# Patient Record
Sex: FEMALE | NOT HISPANIC OR LATINO
[De-identification: names, ages, dates, MRNs, and addresses within clinical notes are randomized per-mention and may not be internally consistent; named-entity substitution may affect disease eponyms.]

---

## 2017-07-21 ENCOUNTER — APPOINTMENT (OUTPATIENT)
Dept: VASCULAR SURGERY | Facility: CLINIC | Age: 32
End: 2017-07-21

## 2017-07-21 VITALS
BODY MASS INDEX: 38.3 KG/M2 | SYSTOLIC BLOOD PRESSURE: 129 MMHG | HEART RATE: 90 BPM | HEIGHT: 59 IN | DIASTOLIC BLOOD PRESSURE: 87 MMHG | TEMPERATURE: 98.2 F | WEIGHT: 190 LBS

## 2017-07-21 RX ORDER — AMOXICILLIN AND CLAVULANATE POTASSIUM 875; 125 MG/1; MG/1
875-125 TABLET, COATED ORAL
Qty: 20 | Refills: 0 | Status: ACTIVE | COMMUNITY
Start: 2017-03-03

## 2017-07-21 RX ORDER — NITROFURANTOIN (MONOHYDRATE/MACROCRYSTALS) 25; 75 MG/1; MG/1
100 CAPSULE ORAL
Qty: 20 | Refills: 0 | Status: ACTIVE | COMMUNITY
Start: 2017-02-05

## 2017-07-21 RX ORDER — FLUTICASONE PROPIONATE 50 UG/1
50 SPRAY, METERED NASAL
Qty: 16 | Refills: 0 | Status: ACTIVE | COMMUNITY
Start: 2017-03-03

## 2017-07-21 RX ORDER — ATORVASTATIN CALCIUM 20 MG/1
20 TABLET, FILM COATED ORAL
Qty: 90 | Refills: 0 | Status: ACTIVE | COMMUNITY
Start: 2017-01-24

## 2017-07-21 RX ORDER — SULFAMETHOXAZOLE AND TRIMETHOPRIM 800; 160 MG/1; MG/1
800-160 TABLET ORAL
Qty: 20 | Refills: 0 | Status: ACTIVE | COMMUNITY
Start: 2017-03-24

## 2017-07-21 RX ORDER — MUPIROCIN 20 MG/G
2 OINTMENT TOPICAL
Qty: 22 | Refills: 0 | Status: ACTIVE | COMMUNITY
Start: 2017-06-16

## 2017-07-21 RX ORDER — SITAGLIPTIN AND METFORMIN HYDROCHLORIDE 50; 1000 MG/1; MG/1
50-1000 TABLET, FILM COATED ORAL
Qty: 180 | Refills: 0 | Status: ACTIVE | COMMUNITY
Start: 2017-04-05

## 2017-07-21 RX ORDER — METHYLPREDNISOLONE 4 MG/1
4 TABLET ORAL
Qty: 21 | Refills: 0 | Status: ACTIVE | COMMUNITY
Start: 2017-03-24

## 2017-07-21 RX ORDER — CIPROFLOXACIN AND DEXAMETHASONE 3; 1 MG/ML; MG/ML
0.3-0.1 SUSPENSION/ DROPS AURICULAR (OTIC)
Qty: 8 | Refills: 0 | Status: ACTIVE | COMMUNITY
Start: 2017-03-24

## 2017-07-21 RX ORDER — DESMOPRESSIN ACETATE 0.2 MG/1
0.2 TABLET ORAL
Qty: 150 | Refills: 0 | Status: ACTIVE | COMMUNITY
Start: 2017-07-16

## 2018-01-26 ENCOUNTER — APPOINTMENT (OUTPATIENT)
Dept: VASCULAR SURGERY | Facility: CLINIC | Age: 33
End: 2018-01-26
Payer: COMMERCIAL

## 2018-01-26 VITALS
WEIGHT: 190 LBS | DIASTOLIC BLOOD PRESSURE: 74 MMHG | SYSTOLIC BLOOD PRESSURE: 105 MMHG | HEIGHT: 59 IN | BODY MASS INDEX: 38.3 KG/M2 | HEART RATE: 101 BPM | TEMPERATURE: 98.4 F

## 2018-01-26 PROCEDURE — 99213 OFFICE O/P EST LOW 20 MIN: CPT

## 2018-01-26 RX ORDER — CLOBETASOL PROPIONATE 0.5 MG/G
0.05 CREAM TOPICAL
Qty: 60 | Refills: 0 | Status: ACTIVE | COMMUNITY
Start: 2017-09-18

## 2018-01-26 RX ORDER — NITROFURANTOIN MACROCRYSTALS 100 MG/1
100 CAPSULE ORAL
Qty: 20 | Refills: 0 | Status: ACTIVE | COMMUNITY
Start: 2017-08-13

## 2018-01-26 RX ORDER — LEVOTHYROXINE SODIUM 25 UG/1
25 TABLET ORAL
Qty: 90 | Refills: 0 | Status: ACTIVE | COMMUNITY
Start: 2017-09-27

## 2018-01-26 RX ORDER — GLIMEPIRIDE 1 MG/1
1 TABLET ORAL
Qty: 180 | Refills: 0 | Status: ACTIVE | COMMUNITY
Start: 2017-09-27

## 2018-01-26 RX ORDER — CLOBETASOL PROPIONATE 0.5 MG/G
0.05 OINTMENT TOPICAL
Qty: 60 | Refills: 0 | Status: ACTIVE | COMMUNITY
Start: 2017-08-22

## 2018-01-26 RX ORDER — CIPROFLOXACIN HYDROCHLORIDE 500 MG/1
500 TABLET, FILM COATED ORAL
Qty: 20 | Refills: 0 | Status: ACTIVE | COMMUNITY
Start: 2017-09-27

## 2018-01-26 RX ORDER — FENOFIBRATE 160 MG/1
160 TABLET ORAL
Qty: 90 | Refills: 0 | Status: ACTIVE | COMMUNITY
Start: 2017-09-27

## 2018-01-26 RX ORDER — DOXYCYCLINE HYCLATE 100 MG/1
100 CAPSULE ORAL
Qty: 20 | Refills: 0 | Status: ACTIVE | COMMUNITY
Start: 2017-11-24

## 2018-01-26 RX ORDER — ATORVASTATIN CALCIUM 40 MG/1
40 TABLET, FILM COATED ORAL
Qty: 90 | Refills: 0 | Status: ACTIVE | COMMUNITY
Start: 2017-09-27

## 2018-08-24 ENCOUNTER — APPOINTMENT (OUTPATIENT)
Dept: VASCULAR SURGERY | Facility: CLINIC | Age: 33
End: 2018-08-24
Payer: COMMERCIAL

## 2018-08-24 VITALS
BODY MASS INDEX: 40.32 KG/M2 | WEIGHT: 200 LBS | HEIGHT: 59 IN | DIASTOLIC BLOOD PRESSURE: 84 MMHG | HEART RATE: 89 BPM | TEMPERATURE: 99.4 F | SYSTOLIC BLOOD PRESSURE: 127 MMHG

## 2018-08-24 DIAGNOSIS — Z00.00 ENCOUNTER FOR GENERAL ADULT MEDICAL EXAMINATION W/OUT ABNORMAL FINDINGS: ICD-10-CM

## 2018-08-24 PROCEDURE — 99214 OFFICE O/P EST MOD 30 MIN: CPT

## 2019-06-28 ENCOUNTER — APPOINTMENT (OUTPATIENT)
Dept: VASCULAR SURGERY | Facility: CLINIC | Age: 34
End: 2019-06-28
Payer: COMMERCIAL

## 2019-06-28 VITALS
DIASTOLIC BLOOD PRESSURE: 94 MMHG | HEIGHT: 59 IN | SYSTOLIC BLOOD PRESSURE: 148 MMHG | HEART RATE: 93 BPM | TEMPERATURE: 98 F

## 2019-06-28 PROCEDURE — 99213 OFFICE O/P EST LOW 20 MIN: CPT

## 2019-06-28 NOTE — DATA REVIEWED
[FreeTextEntry1] : 03/08/2013 Venous Doppler  magdalena neg dvt svt RT LSV insuff \par \par  03/08/2013 Abd Venous Duplex ivc and civ not viz magdalena eiv patent

## 2019-06-28 NOTE — ASSESSMENT
[Arterial/Venous Disease] : arterial/venous disease [Other: _____] : [unfilled] [Foot care/Footwear] : foot care/footwear [FreeTextEntry1] : Impression stable venous insuff\par \par Med Conserv management leg elevation,continue  custom knee high comp stockings, wt loss, diet control\par ov 6mo prn \par \par letter faxed to Dr CHRISTINE Pace DPM \par \par Varicose veins are enlarged, twisted veins. Varicose veins are caused by increased blood pressure in the veins.  The blood moves towards the heart by 1-way valves in the veins. When the valves become weakened or damaged, blood can collect in the veins and pool in your lower legs (ankles). This causes the veins to become enlarged and incompetent with reflux. Sitting or standing for long periods can cause blood to pool in the leg veins, increasing the pressure within the veins. \par Risk factors for varicose veins or venous disease may include:  obesity, older age, standing or sitting for prolonged periods of time for several years, being female, pregnancy, taking oral contraceptive pills or hormone replacement, being inactive, and/or smoking. \par The most common symptoms of varicose veins are sensations in the legs, such as a heavy feeling, burning, and/or aching. However, each individual may experience symptoms differently.  Other symptoms may include:  color changes in the skin, sores on the legs, or rash.  Severe varicose veins or venous disease may eventually produce long-term mild swelling that can result in more serious skin and tissue problems, such as ulcers and non-healing sores.\par Varicose veins and venous disease are diagnosed by a complete medical history, physical examination, and diagnostic studies for varicose veins including duplex ultrasound and color-flow imaging.  \par Medical treatment for varicose veins and venous disease include:  compression stockings, sclerotherapy, endovenous ablation and/or surgical treatment with microphlebectomy.  \par \par

## 2019-06-28 NOTE — PHYSICAL EXAM
[Normal Breath Sounds] : Normal breath sounds [1+] : left 1+ [Ankle Swelling Bilaterally] : bilaterally  [Ankle Swelling (On Exam)] : present [Varicose Veins Of Lower Extremities] : bilaterally [Ankle Swelling On The Right] : mild [] : bilaterally [No HSM] : no hepatosplenomegaly [No Rash or Lesion] : No rash or lesion [Alert] : alert [Oriented to Person] : oriented to person [Oriented to Place] : oriented to place [Oriented to Time] : oriented to time [Calm] : calm [JVD] : no jugular venous distention  [Abdomen Masses] : No abdominal masses [Tender] : was nontender [Stool Sample Taken] : No stool obtained  on rectal exam [de-identified] : nad [de-identified] : wnl [FreeTextEntry1] : Moderate bilateral leg venous insufficiency \par w mild bilateral leg stasis dermatitis \par and mild bilateral leg  edema \par no wounds/ulcers\par Mild arterial insuff w mild trophic skin changes\par no wounds/ulcers\par  [de-identified] : wnl [de-identified] : in wheelchair  [de-identified] : Federico Cranial nerves 2-12 federico grossly intact [de-identified] : cooperative

## 2019-06-28 NOTE — HISTORY OF PRESENT ILLNESS
[FreeTextEntry1] : pt is compliant w compression stockings \par pt c/o mild  swelling since last ov,   no pain since last ov c/o \par pt does not have any le sensation  which is unchanged since last ov

## 2020-01-08 ENCOUNTER — APPOINTMENT (OUTPATIENT)
Dept: VASCULAR SURGERY | Facility: CLINIC | Age: 35
End: 2020-01-08
Payer: COMMERCIAL

## 2020-01-08 VITALS
TEMPERATURE: 98.6 F | DIASTOLIC BLOOD PRESSURE: 78 MMHG | HEIGHT: 59 IN | SYSTOLIC BLOOD PRESSURE: 118 MMHG | WEIGHT: 275 LBS | HEART RATE: 81 BPM | BODY MASS INDEX: 55.44 KG/M2

## 2020-01-08 DIAGNOSIS — I83.899 VARICOSE VEINS OF UNSPECIFIED LOWER EXTREMITY WITH OTHER COMPLICATIONS: ICD-10-CM

## 2020-01-08 PROCEDURE — 99213 OFFICE O/P EST LOW 20 MIN: CPT

## 2020-01-08 NOTE — ASSESSMENT
[Other: _____] : [unfilled] [Arterial/Venous Disease] : arterial/venous disease [Foot care/Footwear] : foot care/footwear [FreeTextEntry1] : Impression stable venous insuff\par \par Med Conserv management leg elevation,continue  custom knee high comp stockings, wt loss, diet control\par ov 12mo prn \par \par letter faxed to Dr CHRISTINE Pace DPM \par \par Varicose veins are enlarged, twisted veins. Varicose veins are caused by increased blood pressure in the veins.  The blood moves towards the heart by 1-way valves in the veins. When the valves become weakened or damaged, blood can collect in the veins and pool in your lower legs (ankles). This causes the veins to become enlarged and incompetent with reflux. Sitting or standing for long periods can cause blood to pool in the leg veins, increasing the pressure within the veins. \par Risk factors for varicose veins or venous disease may include:  obesity, older age, standing or sitting for prolonged periods of time for several years, being female, pregnancy, taking oral contraceptive pills or hormone replacement, being inactive, and/or smoking. \par The most common symptoms of varicose veins are sensations in the legs, such as a heavy feeling, burning, and/or aching. However, each individual may experience symptoms differently.  Other symptoms may include:  color changes in the skin, sores on the legs, or rash.  Severe varicose veins or venous disease may eventually produce long-term mild swelling that can result in more serious skin and tissue problems, such as ulcers and non-healing sores.\par Varicose veins and venous disease are diagnosed by a complete medical history, physical examination, and diagnostic studies for varicose veins including duplex ultrasound and color-flow imaging.  \par Medical treatment for varicose veins and venous disease include:  compression stockings, sclerotherapy, endovenous ablation and/or surgical treatment with microphlebectomy.  \par \par

## 2020-01-08 NOTE — PHYSICAL EXAM
[Normal Breath Sounds] : Normal breath sounds [Ankle Swelling (On Exam)] : present [1+] : left 1+ [Ankle Swelling Bilaterally] : bilaterally  [Varicose Veins Of Lower Extremities] : bilaterally [Ankle Swelling On The Right] : mild [] : bilaterally [No HSM] : no hepatosplenomegaly [No Rash or Lesion] : No rash or lesion [Alert] : alert [Oriented to Person] : oriented to person [Oriented to Place] : oriented to place [Oriented to Time] : oriented to time [Calm] : calm [JVD] : no jugular venous distention  [Abdomen Masses] : No abdominal masses [Tender] : was nontender [Stool Sample Taken] : No stool obtained  on rectal exam [de-identified] : nad [de-identified] : wnl [FreeTextEntry1] : Moderate bilateral leg venous insufficiency \par w mild bilateral leg stasis dermatitis \par and mild bilateral leg  edema \par no wounds/ulcers\par Mild arterial insuff w mild trophic skin changes\par no wounds/ulcers\par  [de-identified] : wnl [de-identified] : in wheelchair  [de-identified] : Federico Cranial nerves 2-12 federico grossly intact [de-identified] : cooperative

## 2020-01-08 NOTE — HISTORY OF PRESENT ILLNESS
[FreeTextEntry1] : pt is compliant w compression stockings \par pt c/o mild  swelling since last ov,   no pain since last ov c/o \par pt does not have any le sensation  which is unchanged since last ov \par no acute changes

## 2020-11-11 ENCOUNTER — APPOINTMENT (OUTPATIENT)
Dept: VASCULAR SURGERY | Facility: CLINIC | Age: 35
End: 2020-11-11

## 2021-01-26 ENCOUNTER — APPOINTMENT (OUTPATIENT)
Dept: VASCULAR SURGERY | Facility: CLINIC | Age: 36
End: 2021-01-26
Payer: COMMERCIAL

## 2021-01-26 VITALS
WEIGHT: 190 LBS | SYSTOLIC BLOOD PRESSURE: 98 MMHG | DIASTOLIC BLOOD PRESSURE: 68 MMHG | HEART RATE: 87 BPM | TEMPERATURE: 98.1 F | BODY MASS INDEX: 38.38 KG/M2

## 2021-01-26 DIAGNOSIS — M79.89 OTHER SPECIFIED SOFT TISSUE DISORDERS: ICD-10-CM

## 2021-01-26 PROCEDURE — 93970 EXTREMITY STUDY: CPT

## 2021-01-26 PROCEDURE — 99214 OFFICE O/P EST MOD 30 MIN: CPT

## 2021-01-26 PROCEDURE — 99072 ADDL SUPL MATRL&STAF TM PHE: CPT

## 2021-01-26 NOTE — HISTORY OF PRESENT ILLNESS
[FreeTextEntry1] : pt is compliant w compression stockings \par pt c/o mild  swelling since last ov,   no pain since last ov c/o \par pt does not have any le sensation  which is unchanged since last ov \par no acute changes [de-identified] : pt is compliant w comp stockings\par pt c/o new magdalena le swelling

## 2021-01-26 NOTE — DATA REVIEWED
[FreeTextEntry1] : 03/08/2013 Venous Doppler  federico neg dvt svt RT LSV insuff \par \par  03/08/2013 Abd Venous Duplex ivc and civ not viz federico eiv patent\par \par 1/26/2021 Venous Duplex Federico LE no acute dvt svt

## 2021-01-26 NOTE — ASSESSMENT
[Arterial/Venous Disease] : arterial/venous disease [Other: _____] : [unfilled] [Foot care/Footwear] : foot care/footwear [FreeTextEntry1] : Impression stable venous insuff\par \par Med Conserv management leg elevation,continue  custom knee high comp stockings 20-30mm Hg w zipper  (rx given) , wt loss, diet control\par telehealth visit aug 2021 \par rto prn \par \par letter faxed to Dr CHRISTINE Pace DPM \par \par Varicose veins are enlarged, twisted veins. Varicose veins are caused by increased blood pressure in the veins.  The blood moves towards the heart by 1-way valves in the veins. When the valves become weakened or damaged, blood can collect in the veins and pool in your lower legs (ankles). This causes the veins to become enlarged and incompetent with reflux. Sitting or standing for long periods can cause blood to pool in the leg veins, increasing the pressure within the veins. \par Risk factors for varicose veins or venous disease may include:  obesity, older age, standing or sitting for prolonged periods of time for several years, being female, pregnancy, taking oral contraceptive pills or hormone replacement, being inactive, and/or smoking. \par The most common symptoms of varicose veins are sensations in the legs, such as a heavy feeling, burning, and/or aching. However, each individual may experience symptoms differently.  Other symptoms may include:  color changes in the skin, sores on the legs, or rash.  Severe varicose veins or venous disease may eventually produce long-term mild swelling that can result in more serious skin and tissue problems, such as ulcers and non-healing sores.\par Varicose veins and venous disease are diagnosed by a complete medical history, physical examination, and diagnostic studies for varicose veins including duplex ultrasound and color-flow imaging.  \par Medical treatment for varicose veins and venous disease include:  compression stockings, sclerotherapy, endovenous ablation and/or surgical treatment with microphlebectomy.  \par \par

## 2021-08-18 ENCOUNTER — APPOINTMENT (OUTPATIENT)
Dept: ENDOVASCULAR SURGERY | Facility: CLINIC | Age: 36
End: 2021-08-18
Payer: COMMERCIAL

## 2021-08-18 ENCOUNTER — APPOINTMENT (OUTPATIENT)
Dept: VASCULAR SURGERY | Facility: CLINIC | Age: 36
End: 2021-08-18

## 2021-08-18 ENCOUNTER — APPOINTMENT (OUTPATIENT)
Dept: ENDOVASCULAR SURGERY | Facility: CLINIC | Age: 36
End: 2021-08-18

## 2021-08-18 DIAGNOSIS — I87.2 VENOUS INSUFFICIENCY (CHRONIC) (PERIPHERAL): ICD-10-CM

## 2021-08-18 DIAGNOSIS — I83.893 VARICOSE VEINS OF BILATERAL LOWER EXTREMITIES WITH OTHER COMPLICATIONS: ICD-10-CM

## 2021-08-18 PROCEDURE — 99441: CPT | Mod: 95

## 2021-08-18 PROCEDURE — XXXXX: CPT | Mod: 1L

## 2021-08-18 NOTE — HISTORY OF PRESENT ILLNESS
[FreeTextEntry1] : pt is compliant w compression stockings \par pt c/o mild  swelling since last ov,   no pain since last ov c/o \par pt does not have any le sensation  which is unchanged since last ov \par no acute changes [de-identified] : pt is compliant w comp stockings\par pt  states no new c/o

## 2021-08-18 NOTE — HISTORY OF PRESENT ILLNESS
[FreeTextEntry1] : pt is compliant w compression stockings \par pt c/o mild  swelling since last ov,   no pain since last ov c/o \par pt does not have any le sensation  which is unchanged since last ov \par no acute changes [de-identified] : pt is compliant w comp stockings\par pt  states no new c/o

## 2021-08-18 NOTE — PHYSICAL EXAM
[No Rash or Lesion] : No rash or lesion [Alert] : alert [Oriented to Person] : oriented to person [Oriented to Place] : oriented to place [Oriented to Time] : oriented to time [Calm] : calm [Stool Sample Taken] : No stool obtained  on rectal exam [de-identified] : no resp distress [FreeTextEntry1] : Physical exam findings via telephonic review with patient \par \par  [de-identified] : cooperative

## 2021-08-18 NOTE — REASON FOR VISIT
[Follow-Up: _____] : a [unfilled] follow-up visit [Home] : at home, [unfilled] , at the time of the visit. [Medical Office: ([facility])___] : at the medical office located in  [Other:____] : [unfilled] [Verbal consent obtained from patient] : the patient, [unfilled] [FreeTextEntry1] : for magdalena leg swelling

## 2021-08-18 NOTE — PHYSICAL EXAM
[No Rash or Lesion] : No rash or lesion [Alert] : alert [Oriented to Person] : oriented to person [Oriented to Place] : oriented to place [Oriented to Time] : oriented to time [Calm] : calm [Stool Sample Taken] : No stool obtained  on rectal exam [de-identified] : no resp distress [FreeTextEntry1] : Physical exam findings via telephonic review with patient \par \par  [de-identified] : cooperative

## 2021-08-18 NOTE — ASSESSMENT
[Arterial/Venous Disease] : arterial/venous disease [Other: _____] : [unfilled] [Foot care/Footwear] : foot care/footwear [FreeTextEntry1] : Impression stable venous insuff\par \par Med Conserv management leg elevation,continue  custom knee high comp stockings 20-30mm Hg w zipper, wt loss, diet control\par telehealth visit 6mo   feb/march 2022\par Telephonic visit  time duration 7 min\par rto prn \par \par letter faxed to Dr CHRISTINE Pace DPM \par \par Varicose veins are enlarged, twisted veins. Varicose veins are caused by increased blood pressure in the veins.  The blood moves towards the heart by 1-way valves in the veins. When the valves become weakened or damaged, blood can collect in the veins and pool in your lower legs (ankles). This causes the veins to become enlarged and incompetent with reflux. Sitting or standing for long periods can cause blood to pool in the leg veins, increasing the pressure within the veins. \par Risk factors for varicose veins or venous disease may include:  obesity, older age, standing or sitting for prolonged periods of time for several years, being female, pregnancy, taking oral contraceptive pills or hormone replacement, being inactive, and/or smoking. \par The most common symptoms of varicose veins are sensations in the legs, such as a heavy feeling, burning, and/or aching. However, each individual may experience symptoms differently.  Other symptoms may include:  color changes in the skin, sores on the legs, or rash.  Severe varicose veins or venous disease may eventually produce long-term mild swelling that can result in more serious skin and tissue problems, such as ulcers and non-healing sores.\par Varicose veins and venous disease are diagnosed by a complete medical history, physical examination, and diagnostic studies for varicose veins including duplex ultrasound and color-flow imaging.  \par Medical treatment for varicose veins and venous disease include:  compression stockings, sclerotherapy, endovenous ablation and/or surgical treatment with microphlebectomy.  \par \par

## 2021-08-18 NOTE — REASON FOR VISIT
[Follow-Up: _____] : a [unfilled] follow-up visit [Home] : at home, [unfilled] , at the time of the visit. [Medical Office: ([facility])___] : at the medical office located in  [Other:____] : [unfilled] [Verbal consent obtained from patient] : the patient, [unfilled] [FreeTextEntry1] : for magdalena leg swelling

## 2021-08-18 NOTE — DATA REVIEWED
[FreeTextEntry1] : 03/08/2013 Venous Doppler  federico neg dvt svt RT LSV insuff \par \par  03/08/2013 Abd Venous Duplex ivc and civ not viz federico eiv patent\par \par 1/26/2021 Venous Duplex Federico LE no acute dvt svt\par \par

## 2024-02-23 NOTE — PHYSICAL EXAM
[Normal Breath Sounds] : Normal breath sounds [1+] : left 1+ [Ankle Swelling (On Exam)] : present [Ankle Swelling Bilaterally] : bilaterally  [Varicose Veins Of Lower Extremities] : bilaterally [] : bilaterally [Ankle Swelling On The Right] : mild [No HSM] : no hepatosplenomegaly [No Rash or Lesion] : No rash or lesion [Alert] : alert [Oriented to Person] : oriented to person [Oriented to Place] : oriented to place [Oriented to Time] : oriented to time [Calm] : calm [JVD] : no jugular venous distention  [Abdomen Masses] : No abdominal masses [Tender] : was nontender [Stool Sample Taken] : No stool obtained  on rectal exam [de-identified] : nad [de-identified] : wnl [FreeTextEntry1] : Moderate bilateral leg venous insufficiency \par w mild bilateral leg stasis dermatitis \par and mild to mod  bilateral leg  edema \par no wounds/ulcers\par Mild arterial insuff w mild trophic skin changes\par no wounds/ulcers\par  [de-identified] : wnl [de-identified] : in wheelchair  [de-identified] : Federico Cranial nerves 2-12 federico grossly intact [de-identified] : cooperative  [TextEntry] : This note was written by Oliver Barreto on 02/23/2024 actively solely JIMMY Newton M.D.  All medical record entries made by the Scribe were at my, JIMMY Newotn M.D. direction and personally dictated by me on 02/23/2024. I have personally reviewed the chart and agree that the record reflects my personal performance of the history, physical exam, assessment, and plan.